# Patient Record
Sex: FEMALE | Race: ASIAN | Employment: OTHER | URBAN - METROPOLITAN AREA
[De-identification: names, ages, dates, MRNs, and addresses within clinical notes are randomized per-mention and may not be internally consistent; named-entity substitution may affect disease eponyms.]

---

## 2019-06-06 ENCOUNTER — OFFICE VISIT (OUTPATIENT)
Dept: URGENT CARE | Facility: URGENT CARE | Age: 54
End: 2019-06-06
Payer: MEDICAID

## 2019-06-06 ENCOUNTER — ANCILLARY PROCEDURE (OUTPATIENT)
Dept: GENERAL RADIOLOGY | Facility: CLINIC | Age: 54
End: 2019-06-06
Attending: PHYSICIAN ASSISTANT
Payer: MEDICAID

## 2019-06-06 VITALS
RESPIRATION RATE: 16 BRPM | OXYGEN SATURATION: 98 % | HEART RATE: 78 BPM | DIASTOLIC BLOOD PRESSURE: 93 MMHG | TEMPERATURE: 98 F | WEIGHT: 131.4 LBS | SYSTOLIC BLOOD PRESSURE: 165 MMHG

## 2019-06-06 DIAGNOSIS — M25.512 ACUTE PAIN OF LEFT SHOULDER: Primary | ICD-10-CM

## 2019-06-06 PROCEDURE — 99204 OFFICE O/P NEW MOD 45 MIN: CPT | Performed by: PHYSICIAN ASSISTANT

## 2019-06-06 PROCEDURE — 73030 X-RAY EXAM OF SHOULDER: CPT | Mod: LT

## 2019-06-06 RX ORDER — LIDOCAINE 50 MG/G
OINTMENT TOPICAL 3 TIMES DAILY
Qty: 240 G | Refills: 0 | Status: SHIPPED | OUTPATIENT
Start: 2019-06-06 | End: 2020-02-20

## 2019-06-06 RX ORDER — IBUPROFEN 600 MG/1
600 TABLET, FILM COATED ORAL EVERY 6 HOURS PRN
Qty: 30 TABLET | Refills: 0 | Status: SHIPPED | OUTPATIENT
Start: 2019-06-06 | End: 2020-02-20

## 2019-06-06 SDOH — HEALTH STABILITY: MENTAL HEALTH: HOW OFTEN DO YOU HAVE A DRINK CONTAINING ALCOHOL?: NEVER

## 2019-06-06 ASSESSMENT — ENCOUNTER SYMPTOMS
PALPITATIONS: 0
WHEEZING: 0
RESPIRATORY NEGATIVE: 1
FATIGUE: 0
SHORTNESS OF BREATH: 0
MYALGIAS: 1
COUGH: 0
NECK PAIN: 0
JOINT SWELLING: 1
FEVER: 0
BACK PAIN: 0
CARDIOVASCULAR NEGATIVE: 1
ARTHRALGIAS: 1
CONSTITUTIONAL NEGATIVE: 1
NECK STIFFNESS: 0
CHILLS: 0

## 2019-06-06 ASSESSMENT — PAIN SCALES - GENERAL: PAINLEVEL: WORST PAIN (10)

## 2019-06-07 NOTE — PROGRESS NOTES
Subjective   Gerson Mcgrath is a 53 year old female who presents to clinic today with son for the following health issues:  HPI   Musculoskeletal problem/pain    Duration: yesterday.  Chronic and intermittent with worsening over the past 2days.  No precipitating trauma or injuries.    Description  Location: L shoulder    Intensity:  moderate, severe    Accompanying signs and symptoms: No radicular pain, numbness, tingling or weakness.  No swelling, redness, drainage or fevers.  Reports decrease ROM.  R hand dominant.    History  Previous similar problem: YES  Previous evaluation:  none    Precipitating or alleviating factors:  Trauma or overuse: no   Aggravating factors include:     Therapies tried and outcome: rest/inactivity, acetaminophen and Ibuprofen with minimal relief      There is no problem list on file for this patient.    No past surgical history on file.    Social History     Tobacco Use     Smoking status: Never Smoker     Smokeless tobacco: Never Used   Substance Use Topics     Alcohol use: Never     Frequency: Never     History reviewed. No pertinent family history.      No current outpatient medications on file.     No Known Allergies    Reviewed and updated as needed this visit by Provider       Review of Systems   Constitutional: Negative.  Negative for chills, fatigue and fever.   Respiratory: Negative.  Negative for cough, shortness of breath and wheezing.    Cardiovascular: Negative.  Negative for chest pain, palpitations and peripheral edema.   Genitourinary: Negative.    Musculoskeletal: Positive for arthralgias, joint swelling and myalgias. Negative for back pain, gait problem, neck pain and neck stiffness.   All other systems reviewed and are negative.           Objective    BP (!) 165/93 (BP Location: Right arm, Patient Position: Sitting, Cuff Size: Adult Regular)   Pulse 78   Temp 98  F (36.7  C) (Oral)   Resp 16   Wt 59.6 kg (131 lb 6.4 oz)   SpO2 98%   There is no height or weight on  file to calculate BMI.  Physical Exam   Constitutional: She is oriented to person, place, and time. She appears well-developed and well-nourished. No distress.   Musculoskeletal:        Right shoulder: Normal. She exhibits normal range of motion, no tenderness, no swelling, no deformity, no laceration and normal strength.        Left shoulder: She exhibits decreased range of motion, tenderness, bony tenderness and spasm. She exhibits no swelling, no effusion, no crepitus, no deformity, no laceration, normal pulse and normal strength.        Left hand: Normal. She exhibits normal range of motion, no bony tenderness, normal two-point discrimination, normal capillary refill, no deformity and no swelling. Normal sensation noted. Normal strength noted.   Neurological: She is alert and oriented to person, place, and time. She has normal strength and normal reflexes. No sensory deficit. Gait normal.   Skin: Skin is warm and intact.   Distal pulses are 2+ and symmetric.  No peripheral edema.   Psychiatric: She has a normal mood and affect. Her behavior is normal. Judgment and thought content normal.   Nursing note and vitals reviewed.  3V of L shoulder:  No acute fractures or dislocations.  No soft tissue swelling or masses.  Per my read.  Will send for overread.           Assessment & Plan   Acute pain of left shoulder:  Xrays are negative for acute injuries. Most likely strain/sprain vs tendinitis vs rotator cuff.  Recommend RICE, codman's exercises, and will give ibuprofen and lidocaine ointment prn pain.  Will also send to orthopedics for further evaluation and management.  Recheck in clinic if symptoms worsen or if symptoms do not improve.   -     XR Shoulder Left G/E 3 Views  -     ORTHO  REFERRAL  -     ibuprofen (ADVIL/MOTRIN) 600 MG tablet; Take 1 tablet (600 mg) by mouth every 6 hours as needed for moderate pain  -     lidocaine (XYLOCAINE) 5 % external ointment; Apply topically 3 times daily        Dionna  See ASHER Jaime  [facility]

## 2020-01-09 ENCOUNTER — OFFICE VISIT (OUTPATIENT)
Dept: FAMILY MEDICINE | Facility: CLINIC | Age: 55
End: 2020-01-09
Payer: MEDICAID

## 2020-01-09 VITALS
SYSTOLIC BLOOD PRESSURE: 180 MMHG | TEMPERATURE: 98.1 F | HEIGHT: 58 IN | BODY MASS INDEX: 27.5 KG/M2 | RESPIRATION RATE: 12 BRPM | DIASTOLIC BLOOD PRESSURE: 99 MMHG | WEIGHT: 131 LBS | OXYGEN SATURATION: 98 % | HEART RATE: 96 BPM

## 2020-01-09 DIAGNOSIS — Z23 NEED FOR IMMUNIZATION AGAINST INFLUENZA: ICD-10-CM

## 2020-01-09 DIAGNOSIS — R03.0 ELEVATED BLOOD PRESSURE READING WITHOUT DIAGNOSIS OF HYPERTENSION: ICD-10-CM

## 2020-01-09 DIAGNOSIS — Z11.4 SCREENING FOR HIV (HUMAN IMMUNODEFICIENCY VIRUS): ICD-10-CM

## 2020-01-09 DIAGNOSIS — Z23 NEED FOR DIPHTHERIA-TETANUS-PERTUSSIS (TDAP) VACCINE: ICD-10-CM

## 2020-01-09 DIAGNOSIS — Z11.59 NEED FOR HEPATITIS C SCREENING TEST: ICD-10-CM

## 2020-01-09 DIAGNOSIS — J20.9 ACUTE BRONCHITIS, UNSPECIFIED ORGANISM: Primary | ICD-10-CM

## 2020-01-09 PROCEDURE — 36415 COLL VENOUS BLD VENIPUNCTURE: CPT | Performed by: FAMILY MEDICINE

## 2020-01-09 PROCEDURE — 90471 IMMUNIZATION ADMIN: CPT | Performed by: FAMILY MEDICINE

## 2020-01-09 PROCEDURE — 90472 IMMUNIZATION ADMIN EACH ADD: CPT | Performed by: FAMILY MEDICINE

## 2020-01-09 PROCEDURE — 90682 RIV4 VACC RECOMBINANT DNA IM: CPT | Performed by: FAMILY MEDICINE

## 2020-01-09 PROCEDURE — 90715 TDAP VACCINE 7 YRS/> IM: CPT | Performed by: FAMILY MEDICINE

## 2020-01-09 PROCEDURE — 99203 OFFICE O/P NEW LOW 30 MIN: CPT | Mod: 25 | Performed by: FAMILY MEDICINE

## 2020-01-09 PROCEDURE — 80048 BASIC METABOLIC PNL TOTAL CA: CPT | Performed by: FAMILY MEDICINE

## 2020-01-09 PROCEDURE — G0472 HEP C SCREEN HIGH RISK/OTHER: HCPCS | Performed by: FAMILY MEDICINE

## 2020-01-09 PROCEDURE — 87389 HIV-1 AG W/HIV-1&-2 AB AG IA: CPT | Performed by: FAMILY MEDICINE

## 2020-01-09 RX ORDER — PREDNISONE 20 MG/1
20 TABLET ORAL
Qty: 6 TABLET | Refills: 0 | Status: SHIPPED | OUTPATIENT
Start: 2020-01-09 | End: 2020-01-23

## 2020-01-09 ASSESSMENT — PAIN SCALES - GENERAL: PAINLEVEL: NO PAIN (0)

## 2020-01-09 ASSESSMENT — MIFFLIN-ST. JEOR: SCORE: 1082.93

## 2020-01-09 NOTE — LETTER
[address]  [address]  [phone]    January 20, 2020      Gerson Mcgrath  [address] [facility] [address]      Ms. Mcgrath,     All of your labs were normal.  Your cholesterol levels were not checked because you were not fasting. You can make at appointment with the lab to check fasting cholesterol levels at your earliest convenience.     Please contact the clinic if you have additional questions.  Thank you.     Sincerely,       Cruz Bueno MD

## 2020-01-10 LAB
ANION GAP SERPL CALCULATED.3IONS-SCNC: 4 MMOL/L (ref 3–14)
BUN SERPL-MCNC: 24 MG/DL (ref 7–30)
CALCIUM SERPL-MCNC: 9.3 MG/DL (ref 8.5–10.1)
CHLORIDE SERPL-SCNC: 107 MMOL/L (ref 94–109)
CO2 SERPL-SCNC: 31 MMOL/L (ref 20–32)
CREAT SERPL-MCNC: 0.76 MG/DL (ref 0.52–1.04)
GFR SERPL CREATININE-BSD FRML MDRD: 88 ML/MIN/{1.73_M2}
GLUCOSE SERPL-MCNC: 132 MG/DL (ref 70–99)
POTASSIUM SERPL-SCNC: 3.6 MMOL/L (ref 3.4–5.3)
SODIUM SERPL-SCNC: 142 MMOL/L (ref 133–144)

## 2020-01-10 NOTE — NURSING NOTE
Prior to immunization administration, verified patients identity using patient s name and date of birth. Please see Immunization Activity for additional information.     Screening Questionnaire for Adult Immunization    Are you sick today?   No   Do you have allergies to medications, food, a vaccine component or latex?   No   Have you ever had a serious reaction after receiving a vaccination?   No   Do you have a long-term health problem with heart, lung, kidney, or metabolic disease (e.g., diabetes), asthma, a blood disorder, no spleen, complement component deficiency, a cochlear implant, or a spinal fluid leak?  Are you on long-term aspirin therapy?   No   Do you have cancer, leukemia, HIV/AIDS, or any other immune system problem?   No   Do you have a parent, brother, or sister with an immune system problem?   No   In the past 3 months, have you taken medications that affect  your immune system, such as prednisone, other steroids, or anticancer drugs; drugs for the treatment of rheumatoid arthritis, Crohn s disease, or psoriasis; or have you had radiation treatments?   No   Have you had a seizure, or a brain or other nervous system problem?   No   During the past year, have you received a transfusion of blood or blood    products, or been given immune (gamma) globulin or antiviral drug?   No   For women: Are you pregnant or is there a chance you could become       pregnant during the next month?   No   Have you received any vaccinations in the past 4 weeks?   No     Immunization questionnaire answers were all negative.        Per orders of Dr. Bueno, injection of tdap given by Dionna Dumont MA. Patient instructed to remain in clinic for 15 minutes afterwards, and to report any adverse reaction to me immediately.       Screening performed by Dionna Dumont MA on 1/9/2020 at 7:19 PM.

## 2020-01-10 NOTE — PROGRESS NOTES
"Subjective     Gerson Mcgrath is a 54 year old female who presents to clinic today for the following health issues. She is accompanied by her .:    HPI   ENT Symptoms             Symptoms: cc Present Absent Comment   Fever/Chills   x    Fatigue  x     Muscle Aches  x     Eye Irritation   x    Sneezing  x     Nasal Eliezer/Drg  x  Runny nose   Sinus Pressure/Pain   x    Loss of smell   x    Dental pain   x    Sore Throat  x  itchy   Swollen Glands   x    Ear Pain/Fullness   x    Cough  x  Dry, persistent   Wheeze   x    Chest Pain   x    Shortness of breath   x    Rash   x    Other  x  Loss of appetite     Symptom duration:  4-5 days   Symptom severity:  moderate   Treatments tried:  None   Contacts:  yes, grandchildren (2 - 3 days after being around them, her symptoms started)     Blood pressure   She hasn't seen a doctor in over a year, and she hadn't heard anything about high blood pressure.    Past medical, family, and social histories, medications, and allergies are reviewed and updated in Epic.     ROS:  Other than noted above, general, HEENT, respiratory, cardiac and gastrointestinal systems are negative.    This document serves as a record of the services and decisions personally performed and made by Dr. Bueno. It was created on his behalf by Becca Deng, a trained medical scribe. The creation of this document is based the provider's statements to the medical scribe.  Becca Deng,  7:00 PM      OBJECTIVE:                                                    BP (!) 180/99   Pulse 96   Temp 98.1  F (36.7  C) (Oral)   Resp 12   Ht 1.48 m (4' 10.25\")   Wt 59.4 kg (131 lb)   SpO2 98%   BMI 27.14 kg/m     Body mass index is 27.14 kg/m .     GENERAL APPEARANCE ADULT: Alert, no acute distress  EYES: PERRL, EOM normal, conjunctiva and lids normal  HENT: right TM normal, left TM normal, nose no nasal discharge or congestion, throat/mouth:normal, mucous membranes moist  RESP: lungs clear to auscultation "   CV: normal rate, regular rhythm, no murmur or gallop  SKIN: no suspicious lesions or rashes to visible skin   NEURO: Alert, oriented, speech and mentation normal, Cranial nerves 2-12 are normal.  PSYCH: mentation appears normal., affect and mood normal     ASSESSMENT/PLAN:                                                      (J20.9) Acute bronchitis, unspecified organism  (primary encounter diagnosis)  Comment: secondary to viral URI  Plan: predniSONE (DELTASONE) 20 MG tablet        Follow-up if symptoms persist past 6 days.    (R03.0) Elevated blood pressure reading without diagnosis of hypertension  Comment: elevated reading #2  Plan: Lipid panel reflex to direct LDL Fasting, Basic        metabolic panel         Return in about 2 weeks (around 1/23/2020) for blood pressure check.      (Z11.4) Screening for HIV (human immunodeficiency virus)  Comment: indications for screening discussed with the patient   Plan: HIV Screening            (Z11.59) Need for hepatitis C screening test  Comment: indications for screening discussed with the patient   Plan: Hepatitis C Screen Reflex to HCV RNA Quant and         Genotype            (Z23) Need for immunization against influenza  Comment: Influenza vaccine offered and accepted by patient. She has received it before without problems.   Plan: INFLUENZA QUAD, RECOMBINANT, P-FREE (RIV4)         (FLUBLOCK) [28312]            (Z23) Need for diphtheria-tetanus-pertussis (Tdap) vaccine  Comment:   Plan: TDAP VACCINE (ADACEL)              The information in this document, created by the medical scribe for me, accurately reflects the services I personally performed and the decisions made by me. I have reviewed and approved this document for accuracy prior to leaving the patient care area. January 9, 2020 7:00 PM  Cruz Bueno MD

## 2020-01-10 NOTE — PATIENT INSTRUCTIONS
At [facility], we strive to deliver an exceptional experience to you, every time we see you.  If you receive a survey in the mail, please send us back your thoughts. We really do value your feedback.    Based on your medical history, these are the current health maintenance/preventive care services that you are due for (some may have been done at this visit.)  Health Maintenance Due   Topic Date Due     PREVENTIVE CARE VISIT  1965     HEPATITIS C SCREENING  1965     ADVANCE CARE PLANNING  1965     MAMMO SCREENING  1965     COLONOSCOPY  11/25/1974     HIV SCREENING  11/25/1979     HPV  11/25/1985     PAP  11/25/1989     LIPID  11/25/2009     ZOSTER IMMUNIZATION (1 of 2) 11/25/2014     INFLUENZA VACCINE (1) 09/01/2019     DTAP/TDAP/TD IMMUNIZATION (2 - Td) 11/13/2019     PHQ-2  01/01/2020         Suggested websites for health information:  Www.[facility].COM.iBoxPay : Up to date and easily searchable information on multiple topics.  Www.medlineplus.gov : medication info, interactive tutorials, watch real surgeries online  Www.familydoctor.org : good info from the Academy of Family Physicians  Www.cdc.gov : public health info, travel advisories, epidemics (H1N1)  Www.aap.org : children's health info, normal development, vaccinations  Www.health.[facility].[place].us : [place] dept of health, public health issues in [place], N1N1    Your care team:                            Family Medicine Internal Medicine   MD Ubaldo Dubois MD Shantel Branch-Fleming, MD Katya Georgiev PA-C Nam Ho, MD Pediatrics   ASHER Andujar, MD Urszula Goldberg CNP, MD Deborah Mielke, MD Kim Thein, APRN CNP      Clinic hours: Monday - Thursday 7 am-7 pm; Fridays 7 am-5 pm.   Urgent care: Monday - Friday 11 am-9 pm; Saturday and Sunday 9 am-5 pm.  Pharmacy : Monday -Thursday 8 am-8 pm; Friday 8 am-6 pm; Saturday and Sunday 9 am-5 pm.      Clinic: [phone]   Pharmacy: [phone]

## 2020-01-13 LAB
HCV AB SERPL QL IA: NONREACTIVE
HIV 1+2 AB+HIV1 P24 AG SERPL QL IA: NONREACTIVE

## 2020-01-23 ENCOUNTER — OFFICE VISIT (OUTPATIENT)
Dept: FAMILY MEDICINE | Facility: CLINIC | Age: 55
End: 2020-01-23
Payer: MEDICAID

## 2020-01-23 VITALS
RESPIRATION RATE: 16 BRPM | TEMPERATURE: 98.2 F | DIASTOLIC BLOOD PRESSURE: 93 MMHG | BODY MASS INDEX: 27.56 KG/M2 | SYSTOLIC BLOOD PRESSURE: 156 MMHG | HEART RATE: 92 BPM | HEIGHT: 58 IN | WEIGHT: 131.3 LBS | OXYGEN SATURATION: 97 %

## 2020-01-23 DIAGNOSIS — J20.9 ACUTE BRONCHITIS WITH SYMPTOMS > 10 DAYS: Primary | ICD-10-CM

## 2020-01-23 DIAGNOSIS — Z12.11 SCREEN FOR COLON CANCER: ICD-10-CM

## 2020-01-23 DIAGNOSIS — Z12.31 ENCOUNTER FOR SCREENING MAMMOGRAM FOR BREAST CANCER: ICD-10-CM

## 2020-01-23 DIAGNOSIS — I10 ESSENTIAL HYPERTENSION WITH GOAL BLOOD PRESSURE LESS THAN 140/90: ICD-10-CM

## 2020-01-23 DIAGNOSIS — K59.00 CONSTIPATION, UNSPECIFIED CONSTIPATION TYPE: ICD-10-CM

## 2020-01-23 PROCEDURE — 99214 OFFICE O/P EST MOD 30 MIN: CPT | Performed by: FAMILY MEDICINE

## 2020-01-23 RX ORDER — DOXYCYCLINE 100 MG/1
100 CAPSULE ORAL 2 TIMES DAILY
Qty: 20 CAPSULE | Refills: 0 | Status: SHIPPED | OUTPATIENT
Start: 2020-01-23 | End: 2020-02-20

## 2020-01-23 RX ORDER — BENZONATATE 200 MG/1
200 CAPSULE ORAL 3 TIMES DAILY PRN
Qty: 30 CAPSULE | Refills: 3 | Status: SHIPPED | OUTPATIENT
Start: 2020-01-23

## 2020-01-23 RX ORDER — LOSARTAN POTASSIUM AND HYDROCHLOROTHIAZIDE 12.5; 5 MG/1; MG/1
1 TABLET ORAL EVERY MORNING
Qty: 30 TABLET | Refills: 3 | Status: SHIPPED | OUTPATIENT
Start: 2020-01-23 | End: 2020-02-20

## 2020-01-23 RX ORDER — POLYETHYLENE GLYCOL 3350 17 G/17G
1 POWDER, FOR SOLUTION ORAL DAILY
Qty: 850 G | Refills: 11 | Status: SHIPPED | OUTPATIENT
Start: 2020-01-23

## 2020-01-23 ASSESSMENT — MIFFLIN-ST. JEOR: SCORE: 1084.29

## 2020-01-23 ASSESSMENT — PAIN SCALES - GENERAL: PAINLEVEL: NO PAIN (0)

## 2020-01-23 NOTE — PROGRESS NOTES
Subjective     Gerson Mcgrath is a 54 year old female who presents to clinic today for the following health issues:    HPI   Acute Illness   Acute illness concerns: cough  Onset: follow up 01/09/2020    Fever: no    Chills/Sweats: no    Headache (location?): YES    Sinus Pressure:no    Conjunctivitis:  no    Ear Pain: no    Rhinorrhea: YES    Congestion: no    Sore Throat: no- itchy      Cough: YES    Wheeze: no    Decreased Appetite: no    Nausea: no    Vomiting: no    Diarrhea:  no    Dysuria/Freq.: no    Fatigue/Achiness: no    Sick/Strep Exposure: no     Therapies Tried and outcome: prednisone       Patient Active Problem List   Diagnosis     Essential hypertension with goal blood pressure less than 140/90     History reviewed. No pertinent surgical history.    Social History     Tobacco Use     Smoking status: Never Smoker     Smokeless tobacco: Never Used   Substance Use Topics     Alcohol use: Never     Frequency: Never     Family History   Family history unknown: Yes         Current Outpatient Medications   Medication Sig Dispense Refill     benzonatate (TESSALON) 200 MG capsule Take 1 capsule (200 mg) by mouth 3 times daily as needed for cough 30 capsule 3     doxycycline monohydrate (MONODOX) 100 MG capsule Take 1 capsule (100 mg) by mouth 2 times daily for 10 days 20 capsule 0     losartan-hydrochlorothiazide (HYZAAR) 50-12.5 MG tablet Take 1 tablet by mouth every morning For blood pressure. 30 tablet 3     polyethylene glycol (MIRALAX/GLYCOLAX) powder Take 17 g (1 capful) by mouth daily For constipation. 850 g 11     ibuprofen (ADVIL/MOTRIN) 600 MG tablet Take 1 tablet (600 mg) by mouth every 6 hours as needed for moderate pain (Patient not taking: Reported on 1/23/2020) 30 tablet 0     lidocaine (XYLOCAINE) 5 % external ointment Apply topically 3 times daily (Patient not taking: Reported on 1/23/2020) 240 g 0     No Known Allergies  BP Readings from Last 3 Encounters:   01/23/20 (!) 156/93   01/09/20 (!)  "180/99   06/06/19 (!) 165/93    Wt Readings from Last 3 Encounters:   01/23/20 59.6 kg (131 lb 4.8 oz)   01/09/20 59.4 kg (131 lb)   06/06/19 59.6 kg (131 lb 6.4 oz)               Reviewed and updated as needed this visit by Provider         Review of Systems   ROS COMP: Constitutional, HEENT, cardiovascular, pulmonary, GI, , musculoskeletal, neuro, skin, endocrine and psych systems are negative, except as otherwise noted.      Objective    BP (!) 156/93 (BP Location: Right arm, Patient Position: Sitting, Cuff Size: Adult Large)   Pulse 92   Temp 98.2  F (36.8  C) (Oral)   Resp 16   Ht 1.48 m (4' 10.25\")   Wt 59.6 kg (131 lb 4.8 oz)   LMP  (LMP Unknown)   SpO2 97%   Breastfeeding No   BMI 27.21 kg/m    Body mass index is 27.21 kg/m .  Physical Exam   GENERAL: healthy, alert and no distress  NECK: no adenopathy, no asymmetry, masses, or scars and thyroid normal to palpation  RESP: lungs clear to auscultation - no rales, rhonchi or wheezes  CV: regular rate and rhythm, normal S1 S2, no S3 or S4, no murmur, click or rub, no peripheral edema and peripheral pulses strong  ABDOMEN: soft, nontender, no hepatosplenomegaly, no masses and bowel sounds normal  MS: no gross musculoskeletal defects noted, no edema    Diagnostic Test Results:  Labs reviewed in Epic        Assessment & Plan     1. Acute bronchitis with symptoms > 10 days  Not improving. Treat with abx. RTC in 2 weeks if no improvements.  - doxycycline monohydrate (MONODOX) 100 MG capsule; Take 1 capsule (100 mg) by mouth 2 times daily for 10 days  Dispense: 20 capsule; Refill: 0  - benzonatate (TESSALON) 200 MG capsule; Take 1 capsule (200 mg) by mouth 3 times daily as needed for cough  Dispense: 30 capsule; Refill: 3    2. Essential hypertension with goal blood pressure less than 140/90  Not controlled. Start losartan/hctz. Reviewed low salt diet. RTC in 1 month for recheck.  - losartan-hydrochlorothiazide (HYZAAR) 50-12.5 MG tablet; Take 1 tablet by " "mouth every morning For blood pressure.  Dispense: 30 tablet; Refill: 3    3. Encounter for screening mammogram for breast cancer    - MA SCREENING DIGITAL BILAT - Future  (s+30); Future    4. Constipation, unspecified constipation type    - polyethylene glycol (MIRALAX/GLYCOLAX) powder; Take 17 g (1 capful) by mouth daily For constipation.  Dispense: 850 g; Refill: 11    5. Screen for colon cancer    - Fecal colorectal cancer screen FIT - Future (S+30); Future     BMI:   Estimated body mass index is 27.21 kg/m  as calculated from the following:    Height as of this encounter: 1.48 m (4' 10.25\").    Weight as of this encounter: 59.6 kg (131 lb 4.8 oz).           Regular exercise  See Patient Instructions    Return in about 4 weeks (around 2/20/2020) for Physical Exam.    Rashad Min MD, MD  [facility]      "

## 2020-01-27 DIAGNOSIS — Z12.11 SCREEN FOR COLON CANCER: ICD-10-CM

## 2020-01-27 LAB — HEMOCCULT STL QL IA: NEGATIVE

## 2020-01-27 PROCEDURE — 82274 ASSAY TEST FOR BLOOD FECAL: CPT | Performed by: FAMILY MEDICINE

## 2020-01-31 ENCOUNTER — ANCILLARY PROCEDURE (OUTPATIENT)
Dept: MAMMOGRAPHY | Facility: CLINIC | Age: 55
End: 2020-01-31
Payer: MEDICAID

## 2020-01-31 DIAGNOSIS — Z12.31 ENCOUNTER FOR SCREENING MAMMOGRAM FOR BREAST CANCER: ICD-10-CM

## 2020-01-31 PROCEDURE — 77067 SCR MAMMO BI INCL CAD: CPT | Mod: TC

## 2020-01-31 PROCEDURE — 77063 BREAST TOMOSYNTHESIS BI: CPT | Mod: TC

## 2020-02-20 ENCOUNTER — OFFICE VISIT (OUTPATIENT)
Dept: FAMILY MEDICINE | Facility: CLINIC | Age: 55
End: 2020-02-20
Payer: MEDICAID

## 2020-02-20 VITALS
DIASTOLIC BLOOD PRESSURE: 82 MMHG | TEMPERATURE: 97.9 F | RESPIRATION RATE: 16 BRPM | BODY MASS INDEX: 27.33 KG/M2 | WEIGHT: 130.2 LBS | HEART RATE: 88 BPM | SYSTOLIC BLOOD PRESSURE: 137 MMHG | OXYGEN SATURATION: 98 % | HEIGHT: 58 IN

## 2020-02-20 DIAGNOSIS — I10 ESSENTIAL HYPERTENSION WITH GOAL BLOOD PRESSURE LESS THAN 140/90: Primary | ICD-10-CM

## 2020-02-20 LAB
ANION GAP SERPL CALCULATED.3IONS-SCNC: 5 MMOL/L (ref 3–14)
BUN SERPL-MCNC: 17 MG/DL (ref 7–30)
CALCIUM SERPL-MCNC: 9.3 MG/DL (ref 8.5–10.1)
CHLORIDE SERPL-SCNC: 108 MMOL/L (ref 94–109)
CO2 SERPL-SCNC: 30 MMOL/L (ref 20–32)
CREAT SERPL-MCNC: 0.72 MG/DL (ref 0.52–1.04)
CREAT UR-MCNC: 207 MG/DL
GFR SERPL CREATININE-BSD FRML MDRD: >90 ML/MIN/{1.73_M2}
GLUCOSE SERPL-MCNC: 103 MG/DL (ref 70–99)
MICROALBUMIN UR-MCNC: 79 MG/L
MICROALBUMIN/CREAT UR: 38.21 MG/G CR (ref 0–25)
POTASSIUM SERPL-SCNC: 3.5 MMOL/L (ref 3.4–5.3)
SODIUM SERPL-SCNC: 143 MMOL/L (ref 133–144)

## 2020-02-20 PROCEDURE — 80048 BASIC METABOLIC PNL TOTAL CA: CPT | Performed by: FAMILY MEDICINE

## 2020-02-20 PROCEDURE — 82043 UR ALBUMIN QUANTITATIVE: CPT | Performed by: FAMILY MEDICINE

## 2020-02-20 PROCEDURE — 99213 OFFICE O/P EST LOW 20 MIN: CPT | Performed by: FAMILY MEDICINE

## 2020-02-20 PROCEDURE — 36415 COLL VENOUS BLD VENIPUNCTURE: CPT | Performed by: FAMILY MEDICINE

## 2020-02-20 RX ORDER — LOSARTAN POTASSIUM AND HYDROCHLOROTHIAZIDE 12.5; 5 MG/1; MG/1
1 TABLET ORAL EVERY MORNING
Qty: 90 TABLET | Refills: 1 | Status: SHIPPED | OUTPATIENT
Start: 2020-02-20 | End: 2021-03-17

## 2020-02-20 ASSESSMENT — PAIN SCALES - GENERAL: PAINLEVEL: NO PAIN (0)

## 2020-02-20 ASSESSMENT — MIFFLIN-ST. JEOR: SCORE: 1079.3

## 2020-02-20 NOTE — PROGRESS NOTES
Subjective     Gerson Mcgrath is a 54 year old female who presents to clinic today for the following health issues:    HPI   Hypertension Follow-up      Do you check your blood pressure regularly outside of the clinic? No     Are you following a low salt diet? Yes    Are your blood pressures ever more than 140 on the top number (systolic) OR more   than 90 on the bottom number (diastolic), for example 140/90? N/a       How many servings of fruits and vegetables do you eat daily?  0-1    On average, how many sweetened beverages do you drink each day (Examples: soda, juice, sweet tea, etc.  Do NOT count diet or artificially sweetened beverages)?   2    How many days per week do you exercise enough to make your heart beat faster? 3 or less    How many minutes a day do you exercise enough to make your heart beat faster? 10 - 19    How many days per week do you miss taking your medication? 0      Patient Active Problem List   Diagnosis     Essential hypertension with goal blood pressure less than 140/90     History reviewed. No pertinent surgical history.    Social History     Tobacco Use     Smoking status: Never Smoker     Smokeless tobacco: Never Used   Substance Use Topics     Alcohol use: Never     Frequency: Never     Family History   Family history unknown: Yes         Current Outpatient Medications   Medication Sig Dispense Refill     benzonatate (TESSALON) 200 MG capsule Take 1 capsule (200 mg) by mouth 3 times daily as needed for cough 30 capsule 3     losartan-hydrochlorothiazide 50-12.5 MG PO tablet Take 1 tablet by mouth every morning For blood pressure. 90 tablet 1     polyethylene glycol (MIRALAX/GLYCOLAX) powder Take 17 g (1 capful) by mouth daily For constipation. 850 g 11     No Known Allergies  BP Readings from Last 3 Encounters:   02/20/20 137/82   01/23/20 (!) 156/93   01/09/20 (!) 180/99    Wt Readings from Last 3 Encounters:   02/20/20 59.1 kg (130 lb 3.2 oz)   01/23/20 59.6 kg (131 lb 4.8 oz)  "  01/09/20 59.4 kg (131 lb)                 Reviewed and updated as needed this visit by Provider         Review of Systems   ROS COMP: Constitutional, HEENT, cardiovascular, pulmonary, GI, , musculoskeletal, neuro, skin, endocrine and psych systems are negative, except as otherwise noted.      Objective    /82 (BP Location: Left arm, Patient Position: Chair, Cuff Size: Adult Regular)   Pulse 88   Temp 97.9  F (36.6  C) (Oral)   Resp 16   Ht 1.48 m (4' 10.25\")   Wt 59.1 kg (130 lb 3.2 oz)   LMP  (LMP Unknown)   SpO2 98%   BMI 26.98 kg/m    Body mass index is 26.98 kg/m .  Physical Exam   GENERAL: healthy, alert and no distress  NECK: no adenopathy, no asymmetry, masses, or scars and thyroid normal to palpation  RESP: lungs clear to auscultation - no rales, rhonchi or wheezes  CV: regular rate and rhythm, normal S1 S2, no S3 or S4, no murmur, click or rub, no peripheral edema and peripheral pulses strong  ABDOMEN: soft, nontender, no hepatosplenomegaly, no masses and bowel sounds normal  MS: no gross musculoskeletal defects noted, no edema    Diagnostic Test Results:  Labs reviewed in Epic        Assessment & Plan     1. Essential hypertension with goal blood pressure less than 140/90  Controlled. Continue with current medications. Recheck renal function and lytes today. RTC in 6 months for recheck with physical. Reviewed low salt diet.  - Basic metabolic panel  - Albumin Random Urine Quantitative with Creat Ratio  - losartan-hydrochlorothiazide 50-12.5 MG PO tablet; Take 1 tablet by mouth every morning For blood pressure.  Dispense: 90 tablet; Refill: 1       Work on weight loss  Regular exercise  See Patient Instructions    Return in about 6 months (around 8/20/2020) for Physical Exam.    Rashad Min MD, MD  [facility]      "

## 2020-02-20 NOTE — LETTER
February 20, 2020      Gerson Alcides  [address] [place] [place] [address]            Dear ,    We are writing to inform you of your test results.    Your kidney and electrolyte tests were normal for you. Please follow up in 6 months for routine physical.     Resulted Orders   Basic metabolic panel   Result Value Ref Range    Sodium 143 133 - 144 mmol/L    Potassium 3.5 3.4 - 5.3 mmol/L    Chloride 108 94 - 109 mmol/L    Carbon Dioxide 30 20 - 32 mmol/L    Anion Gap 5 3 - 14 mmol/L    Glucose 103 (H) 70 - 99 mg/dL      Comment:      Non Fasting    Urea Nitrogen 17 7 - 30 mg/dL    Creatinine 0.72 0.52 - 1.04 mg/dL    GFR Estimate >90 >60 mL/min/[1.73_m2]      Comment:      Non  GFR Calc  Starting 12/18/2018, serum creatinine based estimated GFR (eGFR) will be   calculated using the Chronic Kidney Disease Epidemiology Collaboration   (CKD-EPI) equation.      GFR Estimate If Black >90 >60 mL/min/[1.73_m2]      Comment:       GFR Calc  Starting 12/18/2018, serum creatinine based estimated GFR (eGFR) will be   calculated using the Chronic Kidney Disease Epidemiology Collaboration   (CKD-EPI) equation.      Calcium 9.3 8.5 - 10.1 mg/dL   Albumin Random Urine Quantitative with Creat Ratio   Result Value Ref Range    Creatinine Urine 207 mg/dL    Albumin Urine mg/L 79 mg/L    Albumin Urine mg/g Cr 38.21 (H) 0 - 25 mg/g Cr       If you have any questions or concerns, please call the clinic at the number listed above.       Sincerely,      Rashad Min MD/mayda

## 2020-02-20 NOTE — PATIENT INSTRUCTIONS
At [facility], we strive to deliver an exceptional experience to you, every time we see you. If you receive a survey, please complete it as we do value your feedback.  If you have MyChart, you can expect to receive results automatically within 24 hours of their completion.  Your provider will send a note interpreting your results as well.   If you do not have MyChart, you should receive your results in about a week by mail.    Your care team:                            Family Medicine Internal Medicine   MD Ubaldo Dubois MD Shantel Branch-Fleming, MD Katya Georgiev PA-C Megan Hill, APRFANNY Min, MD Pediatrics   Ken Villa, PARossC  Yolie Moreno, MD Tete Alicea APRN CNP   MD Urszula Melendrez MD Deborah Mielke, MD Kim Thein, APRN CNP      Clinic hours: Monday - Thursday 7 am-7 pm; Fridays 7 am-5 pm.   Urgent care: Monday - Friday 11 am-9 pm; Saturday and Sunday 9 am-5 pm.  Pharmacy : Monday -Thursday 8 am-8 pm; Friday 8 am-6 pm; Saturday and Sunday 9 am-5 pm.     Clinic: [phone]   Pharmacy: [phone]

## 2020-06-11 ENCOUNTER — VIRTUAL VISIT (OUTPATIENT)
Dept: FAMILY MEDICINE | Facility: OTHER | Age: 55
End: 2020-06-11

## 2020-06-11 NOTE — PROGRESS NOTES
"Date: 2020 15:29:49  Clinician: Ramone Lal  Clinician NPI: [id number]  Patient: Gerson Mcgrath  Patient : 1965  Patient Address: [address] [place] [address]  Patient Phone: [phone]  Visit Protocol: URI  Patient Summary:  Gerson is a 54 year old ( : 1965 ) female who initiated a Visit for COVID-19 (Coronavirus) evaluation and screening. When asked the question \"Please sign me up to receive news, health information and promotions. \", Gerson responded \"No\".    Gerson states her symptoms started gradually 3-4 days ago.   Her symptoms consist of a sore throat, malaise, myalgia, a cough, and a headache.   Symptom details     Cough: Gerson coughs a few times an hour and her cough is more bothersome at night. Phlegm does not come into her throat when she coughs. She does not believe her cough is caused by post-nasal drip.     Sore throat: Gerson reports having mild throat pain (1-3 on a 10 point pain scale), does not have exudate on her tonsils, and can swallow liquids. She is not sure if the lymph nodes in her neck are enlarged. A rash has not appeared on the skin since the sore throat started.     Headache: She states the headache is mild (1-3 on a 10 point pain scale).      Gerson denies having wheezing, nausea, teeth pain, ageusia, diarrhea, anosmia, facial pain or pressure, fever, nasal congestion, vomiting, rhinitis, ear pain, and chills. She also denies having recent facial or sinus surgery in the past 60 days, double sickening (worsening symptoms after initial improvement), and taking antibiotic medication for the symptoms. She is not experiencing dyspnea.   Precipitating events  Gerson is not sure if she has been exposed to someone with strep throat. She has not recently been exposed to someone with influenza. Gerson has been in close contact with the following high risk individuals: children under the age of 5.   Pertinent COVID-19 (Coronavirus) information  In the past 14 days, Gerson has " not worked in a congregate living setting.   She does not work or volunteer as healthcare worker or a  and does not work or volunteer in a healthcare facility.   Gerson also has not lived in a congregate living setting in the past 14 days. She does not live with a healthcare worker.   Gerson has had a close contact with a laboratory-confirmed COVID-19 patient within 14 days of symptom onset. Additional information about contact with COVID-19 (Coronavirus) patient as reported by the patient (free text): I've in direct contact with an individual who tested positive for the COVID-19.   Pertinent medical history  Gerson does not get yeast infections when she takes antibiotics.   Gerson does not need a return to work/school note.   Weight: 135 lbs   Gerson does not smoke or use smokeless tobacco.   Weight: 135 lbs    MEDICATIONS: No current medications, ALLERGIES: NKDA  Clinician Response:  Dear Gerson,   Your symptoms show that you may have coronavirus (COVID-19). This illness can cause fever, cough and trouble breathing. Many people get a mild case and get better on their own. Some people can get very sick.  What should I do?  We would like to test you for this virus. This will be a curbside test done outside the clinic.   1. Please call [phone] to schedule your visit. Explain that you were referred by [facility] to have a COVID-19 test. Be ready to share your [facility] visit ID number.  The following will serve as your written order for this COVID Test, ordered by me, for the indication of suspected COVID [Z20.828]: The test will be ordered in [employer], our electronic health record, after you are scheduled. It will show as ordered and authorized by Kirby Dumont MD.  Order: COVID-19 (Coronavirus) PCR for SYMPTOMATIC testing from [facility].      2. When it's time for your COVID test:  Stay at least 6 feet away from others. (If someone will drive you to your test, stay in the backseat, as far away from the  as you can.)    "Cover your mouth and nose with a mask, tissue or washcloth.  Go straight to the testing site. Don't make any stops on the way there or back.      3.Starting now: Stay home and away from others (self-isolate) until:   You've had no fever---and no medicine that reduces fever---for 3 full days (72 hours). And...   Your other symptoms have gotten better. For example, your cough or breathing has improved. And...   At least 10 days have passed since your symptoms started.       During this time, don't leave the house except for testing or medical care.   Stay in your own room, even for meals. Use your own bathroom if you can.   Stay away from others in your home. No hugging, kissing or shaking hands. No visitors.  Don't go to work, school or anywhere else.    Clean \"high touch\" surfaces often (doorknobs, counters, handles, etc.). Use a household cleaning spray or wipes. You'll find a full list of  on the EPA website: www.epa.gov/pesticide-registration/list-n-disinfectants-use-against-sars-cov-2.   Cover your mouth and nose with a mask, tissue or washcloth to avoid spreading germs.  Wash your hands and face often. Use soap and water.  Caregivers in these groups are at risk for severe illness due to COVID-19:  o People 65 years and older  o People who live in a nursing home or long-term care facility  o People with chronic disease (lung, heart, cancer, diabetes, kidney, liver, immunologic)  o People who have a weakened immune system, including those who:   Are in cancer treatment  Take medicine that weakens the immune system, such as corticosteroids  Had a bone marrow or organ transplant  Have an immune deficiency  Have poorly controlled HIV or AIDS  Are obese (body mass index of 40 or higher)  Smoke regularly   o Caregivers should wear gloves while washing dishes, handling laundry and cleaning bedrooms and bathrooms.  o Use caution when washing and drying laundry: Don't shake dirty laundry, and use the warmest " water setting that you can.  o For more tips, go to www.cdc.gov/coronavirus/2019-ncov/downloads/10Things.pdf.      How can I take care of myself?   Get lots of rest. Drink extra fluids (unless a doctor has told you not to).   Take Tylenol (acetaminophen) for fever or pain. If you have liver or kidney problems, ask your family doctor if it's okay to take Tylenol.   Adults can take either:    650 mg (two 325 mg pills) every 4 to 6 hours, or...   1,000 mg (two 500 mg pills) every 8 hours as needed.    Note: Don't take more than 3,000 mg in one day. Acetaminophen is found in many medicines (both prescribed and over-the-counter medicines). Read all labels to be sure you don't take too much.   For children, check the Tylenol bottle for the right dose. The dose is based on the child's age or weight.    If you have other health problems (like cancer, heart failure, an organ transplant or severe kidney disease): Call your specialty clinic if you don't feel better in the next 2 days.       Know when to call 911. Emergency warning signs include:    Trouble breathing or shortness of breath Pain or pressure in the chest that doesn't go away Feeling confused like you haven't felt before, or not being able to wake up Bluish-colored lips or face.  Where can I get more information?   [facility] -- About COVID-19: [email or web address]   CDC -- What to Do If You're Sick: www.cdc.gov/coronavirus/2019-ncov/about/steps-when-sick.html   CDC -- Ending Home Isolation: www.cdc.gov/coronavirus/2019-ncov/hcp/disposition-in-home-patients.html   CDC -- Caring for Someone: www.cdc.gov/coronavirus/2019-ncov/if-you-are-sick/care-for-someone.html   [facility] -- Interim Guidance for Hospital Discharge to Home: www.health.[facility].[place]/diseases/coronavirus/hcp/hospdischarge.pdf   [facility] clinical trials (COVID-19 research studies): clinicalaffairs.[facility]/umn-clinical-trials    Below are the COVID-19 hotlines at the  [place] Department of Health ([facility]). Interpreters are available.    For health questions: Call 668-967-9026 or 1-937.363.1495 (7 a.m. to 7 p.m.) For questions about schools and childcare: Call 677-348-9230 or 1-825.561.2629 (7 a.m. to 7 p.m.)    Diagnosis: Other malaise  Diagnosis ICD: R53.81

## 2021-03-17 ENCOUNTER — ANCILLARY PROCEDURE (OUTPATIENT)
Dept: GENERAL RADIOLOGY | Facility: CLINIC | Age: 56
End: 2021-03-17
Attending: FAMILY MEDICINE
Payer: MEDICAID

## 2021-03-17 ENCOUNTER — OFFICE VISIT (OUTPATIENT)
Dept: FAMILY MEDICINE | Facility: CLINIC | Age: 56
End: 2021-03-17
Payer: MEDICAID

## 2021-03-17 VITALS
HEIGHT: 58 IN | RESPIRATION RATE: 18 BRPM | SYSTOLIC BLOOD PRESSURE: 136 MMHG | OXYGEN SATURATION: 98 % | HEART RATE: 91 BPM | TEMPERATURE: 98.6 F | WEIGHT: 135 LBS | DIASTOLIC BLOOD PRESSURE: 85 MMHG | BODY MASS INDEX: 28.34 KG/M2

## 2021-03-17 DIAGNOSIS — I10 ESSENTIAL HYPERTENSION WITH GOAL BLOOD PRESSURE LESS THAN 140/90: ICD-10-CM

## 2021-03-17 DIAGNOSIS — G89.29 CHRONIC RIGHT SHOULDER PAIN: Primary | ICD-10-CM

## 2021-03-17 DIAGNOSIS — R21 RASH: ICD-10-CM

## 2021-03-17 DIAGNOSIS — J20.9 ACUTE BRONCHITIS WITH SYMPTOMS > 10 DAYS: ICD-10-CM

## 2021-03-17 DIAGNOSIS — M25.511 CHRONIC RIGHT SHOULDER PAIN: Primary | ICD-10-CM

## 2021-03-17 PROCEDURE — 99214 OFFICE O/P EST MOD 30 MIN: CPT | Performed by: FAMILY MEDICINE

## 2021-03-17 PROCEDURE — 73030 X-RAY EXAM OF SHOULDER: CPT | Mod: RT | Performed by: RADIOLOGY

## 2021-03-17 RX ORDER — TRIAMCINOLONE ACETONIDE 1 MG/G
OINTMENT TOPICAL 2 TIMES DAILY
Qty: 80 G | Refills: 3 | Status: SHIPPED | OUTPATIENT
Start: 2021-03-17

## 2021-03-17 RX ORDER — KETOCONAZOLE 20 MG/G
CREAM TOPICAL 2 TIMES DAILY
Qty: 60 G | Refills: 3 | Status: SHIPPED | OUTPATIENT
Start: 2021-03-17

## 2021-03-17 RX ORDER — LOSARTAN POTASSIUM AND HYDROCHLOROTHIAZIDE 12.5; 5 MG/1; MG/1
1 TABLET ORAL EVERY MORNING
Qty: 90 TABLET | Refills: 3 | Status: SHIPPED | OUTPATIENT
Start: 2021-03-17

## 2021-03-17 ASSESSMENT — MIFFLIN-ST. JEOR: SCORE: 1096.08

## 2021-03-17 ASSESSMENT — PAIN SCALES - GENERAL: PAINLEVEL: WORST PAIN (10)

## 2021-03-17 NOTE — PATIENT INSTRUCTIONS
At [facility], we strive to deliver an exceptional experience to you, every time we see you. If you receive a survey, please complete it as we do value your feedback.  If you have MyChart, you can expect to receive results automatically within 24 hours of their completion.  Your provider will send a note interpreting your results as well.   If you do not have MyChart, you should receive your results in about a week by mail.    Your care team:                            Family Medicine Internal Medicine   MD Ubaldo Dubois MD Shantel Branch-Fleming, MD Srinivasa Vaka, MD Katya Belousova, PAHOMER Roblero, APRN CNP    Rashad Min, MD Pediatrics   Ken Villa, PAHOMER Moreno, CNP MD Tete Coon APRN CNP   MD Urszula Melendrez MD Deborah Mielke, MD Rosa Ward, APRN [facility]      Clinic hours: Monday - Thursday 7 am-6 pm; Fridays 7 am-5 pm.   Urgent care: Monday - Friday 11 am-9 pm; Saturday and Sunday 9 am-5 pm.    Clinic: [phone]       [facility]: Monday - Thursday 8 am - 7 pm; Friday 8 am - 6 pm  [facility]: [phone]     Use www.[email or web address].org for 24/7 diagnosis and treatment of dozens of conditions.

## 2021-03-17 NOTE — PROGRESS NOTES
"Jesse Nieto is a 55 year old who presents for the following health issues   HPI     Musculoskeletal problem/pain  Onset/Duration:couple weeks  Description  Location:  right shoulder  Joint Swelling: no  Redness: no  Pain: YES- 10  Warmth: no  Intensity:  severe  Progression of Symptoms:  worsening  Accompanying signs and symptoms:   Fevers: no  Numbness/tingling/weakness: no  History  Trauma to the area: no  Recent illness:  no  Previous similar problem: YES  Previous evaluation:  YES  Precipitating or alleviating factors:  Aggravating factors include: lifting, exercise and overuse  Therapies tried and outcome: stretching, exercises, acetaminophen and but not helping    Patient would like a rash looked at under her left breast.    Review of Systems   Constitutional, HEENT, cardiovascular, pulmonary, GI, , musculoskeletal, neuro, skin, endocrine and psych systems are negative, except as otherwise noted.      Objective    /85   Pulse 91   Temp 98.6  F (37  C) (Tympanic)   Resp 18   Ht 1.48 m (4' 10.25\")   Wt 61.2 kg (135 lb)   LMP  (LMP Unknown)   SpO2 98%   BMI 27.97 kg/m    Body mass index is 27.97 kg/m .  Physical Exam   GENERAL: healthy, alert and no distress  NECK: no adenopathy, no asymmetry, masses, or scars and thyroid normal to palpation  RESP: lungs clear to auscultation - no rales, rhonchi or wheezes  CV: regular rate and rhythm, normal S1 S2, no S3 or S4, no murmur, click or rub, no peripheral edema and peripheral pulses strong  ABDOMEN: soft, nontender, no hepatosplenomegaly, no masses and bowel sounds normal  MS: positive O'fany's right  Skin: erythematous patches under left breast.    A/P:  (M25.511,G89.29) Chronic right shoulder pain  (primary encounter diagnosis)  Comment:   Plan: XR Shoulder Right 2 Views, MR Shoulder Right         w/o Contrast, diclofenac (VOLTAREN) 50 MG EC         tablet        Labral tear? Will await MRI scan. May take nsaids sparingly for " pain.    (R21) Rash  Comment:   Plan: ketoconazole (NIZORAL) 2 % external cream,         triamcinolone (KENALOG) 0.1 % external ointment        Fungal? Treat with anti-fungal with topical steroid.    (I10) Essential hypertension with goal blood pressure less than 140/90  Comment:   Plan: losartan-hydrochlorothiazide (HYZAAR) 50-12.5         MG tablet        Controlled. Recheck in 6 months with physical.    Rashad Min MD

## 2021-04-07 ENCOUNTER — ANCILLARY PROCEDURE (OUTPATIENT)
Dept: MRI IMAGING | Facility: CLINIC | Age: 56
End: 2021-04-07
Attending: FAMILY MEDICINE
Payer: MEDICAID

## 2021-04-07 DIAGNOSIS — M25.511 CHRONIC RIGHT SHOULDER PAIN: ICD-10-CM

## 2021-04-07 DIAGNOSIS — G89.29 CHRONIC RIGHT SHOULDER PAIN: ICD-10-CM

## 2021-04-07 PROCEDURE — 73221 MRI JOINT UPR EXTREM W/O DYE: CPT | Mod: RT | Performed by: RADIOLOGY

## 2021-04-08 NOTE — PROGRESS NOTES
SUBJECTIVE:  Gerson Mcgrath is a 55 year old female who is seen in consultation at the request of Rashad Min MD for right shoulder pain that started  that started/occurred  6 weeks ago. Has had chronic problems x 5-6 years with episodes of pain, this episode is the longest.    Present symptoms: can't do behind-back activities or x-body movements with right shoulder  Pain laying on right   Pain to lift things and weak.  Feels crepitations.  Pain location: lateral shoulder  Associated symptoms: no neck pain    Treatment up to this point:NSAIDS  Prior history of related problems:  Significant Orthopedic past medical history:     No past medical history on file.    No past surgical history on file.    REVIEW OF SYSTEMS:  CONSTITUTIONAL:  NEGATIVE for fever, chills, change in weight  INTEGUMENTARY/SKIN:  NEGATIVE for worrisome rashes, moles or lesions  EYES:  NEGATIVE for vision changes or irritation  ENT/MOUTH:  NEGATIVE for ear, mouth and throat problems  RESP:  NEGATIVE for significant cough or SOB  BREAST:  NEGATIVE for masses, tenderness or discharge  CV:  NEGATIVE for chest pain, palpitations or peripheral edema  GI:  NEGATIVE for nausea, abdominal pain, heartburn, or change in bowel habits  :  Negative   MUSCULOSKELETAL:  See HPI above  NEURO:  NEGATIVE for weakness, dizziness or paresthesias  ENDOCRINE:  NEGATIVE for temperature intolerance, skin/hair changes  HEME/ALLERGY/IMMUNE:  NEGATIVE for bleeding problems  PSYCHIATRIC:  NEGATIVE for changes in mood or affect    OBJECTIVE:  /82 (BP Location: Left arm, Patient Position: Sitting, Cuff Size: Adult Regular)   Pulse 90   LMP  (LMP Unknown)   SpO2 97%      GENERAL: healthy, alert and no distress  GAIT: normal   SKIN: no suspicious lesions or rashes  NEURO: Normal strength and tone, mentation intact and speech normal  VASCULAR:  normal pulses and cappillary refill   PSYCH:  mentation appears normal and affect normal/bright    MUSCULOSKELETAL:    NECK:  Cervical  range of motion: full,  painfree, and does not cause shoulder pain or reproduce shoulder pain.  Sensory deficits:  none  Motor deficits:  none    SHOULDER:  Shoulder Inspection: no swelling, bruising, discoloration, or obvious deformity or asymmetry  no atrophy  Tender: diffuse  Range of Motion:   Active:all normal   Passive: all normal, crepitations mild  Impingement: Fernandez: 2, Neer: 0 and AER: 1  Strength: forward flexion 5/5, painful, External rotation 5/5  Liftoff: Able   Special tests: Sulcus: 0  Speed: equivocal  Anterior Drawer: 0  Posterior Drawer: 0    X-RAY INTERPRETATION  3/17/21 reviewed with patient: 3 mm ovoid calcification of the greater tuberosity  consistent with calcific tendinopathy. Normal glenohumeral alignment.  The acromioclavicular joint is unremarkable. No fracture or osseous  lesion.     MRI:  Right shoulder, reviewed with the patient:  Impression:  1. Hydroxyapatite deposition of supraspinatus, likely active flare  (calcific tendinitis). Apparent associated moderate grade bursal sided  tear may be accentuated due to calcific tendinitis.   a. Additional low to moderate grade articular sided tear of  supraspinatus.  2. Subacromial/subdeltoid bursitis.  3. Signal heterogeneity posterior superior labrum, may be due to  motion and magic angle artifacts though underlying labral tear cannot  be entirely excluded.     ASSESSMENT    ICD-10-CM    1. Rotator cuff tendonitis, right  M75.81    2. Calcific tendonitis  M65.20        PLAN:   physical therapy and corticosteroid injection discussed  She feels that her pain is  severe enough to try a corticosteroid injection a this time  Procedure Note:   Informed consent obtained. Risks, benefits and complications of the injection were discussed with the patient and the patient elected to proceed. Right shoulder injected into the subacromial space after sterile prep, using 80mg Depomedrol and 4cc local anesthetic.  physical therapy ordered.    Consider  alfa down the road if not improved..      .MOISE Babin MD  Dept. Orthopedic Surgery  [facility]

## 2021-04-10 ENCOUNTER — IMMUNIZATION (OUTPATIENT)
Dept: NURSING | Facility: CLINIC | Age: 56
End: 2021-04-10
Payer: MEDICAID

## 2021-04-10 PROCEDURE — 91301 PR COVID VAC MODERNA 100 MCG/0.5 ML IM: CPT

## 2021-04-10 PROCEDURE — 0011A PR COVID VAC MODERNA 100 MCG/0.5 ML IM: CPT

## 2021-04-13 ENCOUNTER — OFFICE VISIT (OUTPATIENT)
Dept: ORTHOPEDICS | Facility: CLINIC | Age: 56
End: 2021-04-13
Attending: FAMILY MEDICINE
Payer: MEDICAID

## 2021-04-13 VITALS — HEART RATE: 90 BPM | SYSTOLIC BLOOD PRESSURE: 138 MMHG | DIASTOLIC BLOOD PRESSURE: 82 MMHG | OXYGEN SATURATION: 97 %

## 2021-04-13 DIAGNOSIS — M75.81 ROTATOR CUFF TENDONITIS, RIGHT: Primary | ICD-10-CM

## 2021-04-13 DIAGNOSIS — G89.29 CHRONIC RIGHT SHOULDER PAIN: ICD-10-CM

## 2021-04-13 DIAGNOSIS — M25.511 CHRONIC RIGHT SHOULDER PAIN: ICD-10-CM

## 2021-04-13 DIAGNOSIS — M65.20 CALCIFIC TENDONITIS: ICD-10-CM

## 2021-04-13 PROCEDURE — 20610 DRAIN/INJ JOINT/BURSA W/O US: CPT | Mod: RT | Performed by: ORTHOPAEDIC SURGERY

## 2021-04-13 PROCEDURE — 99207 PR DROP WITH A PROCEDURE: CPT | Performed by: ORTHOPAEDIC SURGERY

## 2021-04-13 RX ORDER — METHYLPREDNISOLONE ACETATE 80 MG/ML
80 INJECTION, SUSPENSION INTRA-ARTICULAR; INTRALESIONAL; INTRAMUSCULAR; SOFT TISSUE
Status: SHIPPED | OUTPATIENT
Start: 2021-04-13

## 2021-04-13 RX ORDER — LIDOCAINE HYDROCHLORIDE 10 MG/ML
4 INJECTION, SOLUTION EPIDURAL; INFILTRATION; INTRACAUDAL; PERINEURAL
Status: SHIPPED | OUTPATIENT
Start: 2021-04-13

## 2021-04-13 RX ADMIN — METHYLPREDNISOLONE ACETATE 80 MG: 80 INJECTION, SUSPENSION INTRA-ARTICULAR; INTRALESIONAL; INTRAMUSCULAR; SOFT TISSUE at 13:45

## 2021-04-13 RX ADMIN — LIDOCAINE HYDROCHLORIDE 4 ML: 10 INJECTION, SOLUTION EPIDURAL; INFILTRATION; INTRACAUDAL; PERINEURAL at 13:45

## 2021-04-13 ASSESSMENT — PAIN SCALES - GENERAL: PAINLEVEL: SEVERE PAIN (6)

## 2021-04-13 NOTE — LETTER
4/13/2021         RE: Gerson Mcgrath  [address]  [place] [address]        Dear Colleague,    Thank you for referring your patient, Gerson Mcgrath, to the [facility] [place]. Please see a copy of my visit note below.    SUBJECTIVE:  Gerson Mcgrath is a 55 year old female who is seen in consultation at the request of Rashad Min MD for right shoulder pain that started  that started/occurred  6 weeks ago. Has had chronic problems x 5-6 years with episodes of pain, this episode is the longest.    Present symptoms: can't do behind-back activities or x-body movements with right shoulder  Pain laying on right   Pain to lift things and weak.  Feels crepitations.  Pain location: lateral shoulder  Associated symptoms: no neck pain    Treatment up to this point:NSAIDS  Prior history of related problems:  Significant Orthopedic past medical history:     No past medical history on file.    No past surgical history on file.    REVIEW OF SYSTEMS:  CONSTITUTIONAL:  NEGATIVE for fever, chills, change in weight  INTEGUMENTARY/SKIN:  NEGATIVE for worrisome rashes, moles or lesions  EYES:  NEGATIVE for vision changes or irritation  ENT/MOUTH:  NEGATIVE for ear, mouth and throat problems  RESP:  NEGATIVE for significant cough or SOB  BREAST:  NEGATIVE for masses, tenderness or discharge  CV:  NEGATIVE for chest pain, palpitations or peripheral edema  GI:  NEGATIVE for nausea, abdominal pain, heartburn, or change in bowel habits  :  Negative   MUSCULOSKELETAL:  See HPI above  NEURO:  NEGATIVE for weakness, dizziness or paresthesias  ENDOCRINE:  NEGATIVE for temperature intolerance, skin/hair changes  HEME/ALLERGY/IMMUNE:  NEGATIVE for bleeding problems  PSYCHIATRIC:  NEGATIVE for changes in mood or affect    OBJECTIVE:  /82 (BP Location: Left arm, Patient Position: Sitting, Cuff Size: Adult Regular)   Pulse 90   LMP  (LMP Unknown)   SpO2 97%      GENERAL: healthy, alert and no distress  GAIT: normal   SKIN: no  suspicious lesions or rashes  NEURO: Normal strength and tone, mentation intact and speech normal  VASCULAR:  normal pulses and cappillary refill   PSYCH:  mentation appears normal and affect normal/bright    MUSCULOSKELETAL:    NECK:  Cervical range of motion: full,  painfree, and does not cause shoulder pain or reproduce shoulder pain.  Sensory deficits:  none  Motor deficits:  none    SHOULDER:  Shoulder Inspection: no swelling, bruising, discoloration, or obvious deformity or asymmetry  no atrophy  Tender: diffuse  Range of Motion:   Active:all normal   Passive: all normal, crepitations mild  Impingement: Fernandez: 2, Neer: 0 and AER: 1  Strength: forward flexion 5/5, painful, External rotation 5/5  Liftoff: Able   Special tests: Sulcus: 0  Speed: equivocal  Anterior Drawer: 0  Posterior Drawer: 0    X-RAY INTERPRETATION  3/17/21 reviewed with patient: 3 mm ovoid calcification of the greater tuberosity  consistent with calcific tendinopathy. Normal glenohumeral alignment.  The acromioclavicular joint is unremarkable. No fracture or osseous  lesion.     MRI:  Right shoulder, reviewed with the patient:  Impression:  1. Hydroxyapatite deposition of supraspinatus, likely active flare  (calcific tendinitis). Apparent associated moderate grade bursal sided  tear may be accentuated due to calcific tendinitis.   a. Additional low to moderate grade articular sided tear of  supraspinatus.  2. Subacromial/subdeltoid bursitis.  3. Signal heterogeneity posterior superior labrum, may be due to  motion and magic angle artifacts though underlying labral tear cannot  be entirely excluded.     ASSESSMENT    ICD-10-CM    1. Rotator cuff tendonitis, right  M75.81    2. Calcific tendonitis  M65.20        PLAN:   physical therapy and corticosteroid injection discussed  She feels that her pain is  severe enough to try a corticosteroid injection a this time  Procedure Note:   Informed consent obtained. Risks, benefits and complications  of the injection were discussed with the patient and the patient elected to proceed. Right shoulder injected into the subacromial space after sterile prep, using 80mg Depomedrol and 4cc local anesthetic.  physical therapy ordered.    Consider barbotage down the road if not improved..      .MOISE Babin MD  Dept. Orthopedic Surgery  [facility]      Large Joint Injection/Arthocentesis: R subacromial bursa    Date/Time: 4/13/2021 1:45 PM  Performed by: Jareth Almaguer  Authorized by: Mario Alberto Babin MD     Needle Size:  22 G  Guidance: landmark guided    Approach:  Lateral  Location:  Shoulder      Site:  R subacromial bursa  Medications:  80 mg methylPREDNISolone 80 MG/ML; 4 mL lidocaine (PF) 1 %  Outcome:  Tolerated well, no immediate complications  Procedure discussed: discussed risks, benefits, and alternatives    Consent Given by:  Patient  Timeout: timeout called immediately prior to procedure    Prep: patient was prepped and draped in usual sterile fashion              Again, thank you for allowing me to participate in the care of your patient.        Sincerely,        Mario Alberto Babin MD

## 2021-04-13 NOTE — PATIENT INSTRUCTIONS
Thanks for coming today.  [facility]  [address] [place], [place] [address]Onjkheenu Injections  Cortisone is a type of steroid. It can greatly reduce inflammation (swelling, redness, and irritation). Being injected with cortisone is simple and doesn t take long. But your doctor may ask you questions about your health. Certain medical conditions, such as diabetes, can be affected by cortisone.     Your pain may be relieved by a cortisone injection.    Why Have a Cortisone Injection?  Injecting cortisone can relieve pain for anything from a sports injury to arthritis. Your doctor may suggest an injection if rest, splints, or oral medication doesn t relieve your pain. Injecting cortisone is simpler than having surgery. And cortisone may provide the lasting pain relief that can help you get out and enjoy life again.  Getting the Injection  Your injection will  start by cleaning and numbing your skin at the injection site. Next, you ll be injected with local anesthetics (for short-term pain relief) and cortisone. The injection may last a few moments. A small bandage will be applied over the injection site. You ll then be ready to go home.  After Your Injection  After being injected, make sure you don t injure the treated region. But stay active. Enjoy a walk or some other mild activity. Just be careful not to strain the region that gave you trouble.  The Next Day or Two  Some patients feel more pain after being injected. This is normal, and it will go away soon. Applying ice for 20 minutes at a time to your injury may reduce the increased pain. Rest for the first day or two. You don t need to stay in bed. But avoid tasks that may strain the injured region.  If You Have Diabetes  Cortisone injections can cause blood sugar to be increased for several days after the injection. Follow your regular plan for what to do when your blood sugar is elevated.     You may have the area injected, in general,  "every three to four months.  This is the estimated amount of time that the body takes to metabolize the \"cortisone.\"  Getting injections too frequently may result in a softening of the cartilage and cause the joint to actually wear out more quickly.  "

## 2021-04-13 NOTE — PROGRESS NOTES
Large Joint Injection/Arthocentesis: R subacromial bursa    Date/Time: 4/13/2021 1:45 PM  Performed by: Jareth Almaguer  Authorized by: Mario Alberto Babin MD     Needle Size:  22 G  Guidance: landmark guided    Approach:  Lateral  Location:  Shoulder      Site:  R subacromial bursa  Medications:  80 mg methylPREDNISolone 80 MG/ML; 4 mL lidocaine (PF) 1 %  Outcome:  Tolerated well, no immediate complications  Procedure discussed: discussed risks, benefits, and alternatives    Consent Given by:  Patient  Timeout: timeout called immediately prior to procedure    Prep: patient was prepped and draped in usual sterile fashion

## 2021-05-08 ENCOUNTER — IMMUNIZATION (OUTPATIENT)
Dept: NURSING | Facility: CLINIC | Age: 56
End: 2021-05-08
Attending: INTERNAL MEDICINE
Payer: MEDICAID

## 2021-05-08 PROCEDURE — 91301 PR COVID VAC MODERNA 100 MCG/0.5 ML IM: CPT

## 2021-05-08 PROCEDURE — 0012A PR COVID VAC MODERNA 100 MCG/0.5 ML IM: CPT

## 2021-05-30 ENCOUNTER — HEALTH MAINTENANCE LETTER (OUTPATIENT)
Age: 56
End: 2021-05-30

## 2021-09-19 ENCOUNTER — HEALTH MAINTENANCE LETTER (OUTPATIENT)
Age: 56
End: 2021-09-19

## 2022-01-26 ENCOUNTER — IMMUNIZATION (OUTPATIENT)
Dept: NURSING | Facility: CLINIC | Age: 57
End: 2022-01-26
Payer: MEDICAID

## 2022-01-26 PROCEDURE — 0064A COVID-19,PF,MODERNA (18+ YRS BOOSTER .25ML): CPT

## 2022-01-26 PROCEDURE — 91306 COVID-19,PF,MODERNA (18+ YRS BOOSTER .25ML): CPT

## 2022-05-01 ENCOUNTER — HEALTH MAINTENANCE LETTER (OUTPATIENT)
Age: 57
End: 2022-05-01

## 2022-06-26 ENCOUNTER — HEALTH MAINTENANCE LETTER (OUTPATIENT)
Age: 57
End: 2022-06-26

## 2022-11-21 ENCOUNTER — HEALTH MAINTENANCE LETTER (OUTPATIENT)
Age: 57
End: 2022-11-21

## 2023-07-08 ENCOUNTER — HEALTH MAINTENANCE LETTER (OUTPATIENT)
Age: 58
End: 2023-07-08

## 2024-08-31 ENCOUNTER — HEALTH MAINTENANCE LETTER (OUTPATIENT)
Age: 59
End: 2024-08-31

## 2025-01-28 ENCOUNTER — TRANSFERRED RECORDS (OUTPATIENT)
Dept: HEALTH INFORMATION MANAGEMENT | Facility: CLINIC | Age: 60
End: 2025-01-28
Payer: MEDICAID

## 2025-01-29 ENCOUNTER — OFFICE VISIT (OUTPATIENT)
Dept: FAMILY MEDICINE | Facility: CLINIC | Age: 60
End: 2025-01-29
Payer: MEDICAID

## 2025-01-29 VITALS
TEMPERATURE: 98.4 F | RESPIRATION RATE: 21 BRPM | HEART RATE: 86 BPM | DIASTOLIC BLOOD PRESSURE: 89 MMHG | SYSTOLIC BLOOD PRESSURE: 159 MMHG | OXYGEN SATURATION: 99 % | BODY MASS INDEX: 28.38 KG/M2 | HEIGHT: 58 IN | WEIGHT: 135.2 LBS

## 2025-01-29 DIAGNOSIS — Z53.20 MAMMOGRAM DECLINED: ICD-10-CM

## 2025-01-29 DIAGNOSIS — H40.053 DISEASE OF BOTH EYES CHARACTERIZED BY INCREASED EYE PRESSURE: ICD-10-CM

## 2025-01-29 DIAGNOSIS — Z13.220 LIPID SCREENING: ICD-10-CM

## 2025-01-29 DIAGNOSIS — Z12.11 SCREEN FOR COLON CANCER: ICD-10-CM

## 2025-01-29 DIAGNOSIS — R20.0 NUMBNESS IN FEET: ICD-10-CM

## 2025-01-29 DIAGNOSIS — I10 ESSENTIAL HYPERTENSION WITH GOAL BLOOD PRESSURE LESS THAN 140/90: Primary | ICD-10-CM

## 2025-01-29 DIAGNOSIS — Z53.20 CERVICAL CANCER SCREENING DECLINED: ICD-10-CM

## 2025-01-29 DIAGNOSIS — Z28.20 VACCINE REFUSED BY PATIENT: ICD-10-CM

## 2025-01-29 LAB
ERYTHROCYTE [DISTWIDTH] IN BLOOD BY AUTOMATED COUNT: 11.6 % (ref 10–15)
EST. AVERAGE GLUCOSE BLD GHB EST-MCNC: 123 MG/DL
HBA1C MFR BLD: 5.9 % (ref 0–5.6)
HCT VFR BLD AUTO: 36.1 % (ref 35–47)
HGB BLD-MCNC: 13 G/DL (ref 11.7–15.7)
MCH RBC QN AUTO: 29.7 PG (ref 26.5–33)
MCHC RBC AUTO-ENTMCNC: 36 G/DL (ref 31.5–36.5)
MCV RBC AUTO: 82 FL (ref 78–100)
PLATELET # BLD AUTO: 238 10E3/UL (ref 150–450)
RBC # BLD AUTO: 4.38 10E6/UL (ref 3.8–5.2)
WBC # BLD AUTO: 7.1 10E3/UL (ref 4–11)

## 2025-01-29 PROCEDURE — 99203 OFFICE O/P NEW LOW 30 MIN: CPT | Performed by: PREVENTIVE MEDICINE

## 2025-01-29 PROCEDURE — 36415 COLL VENOUS BLD VENIPUNCTURE: CPT | Performed by: PREVENTIVE MEDICINE

## 2025-01-29 PROCEDURE — 82570 ASSAY OF URINE CREATININE: CPT | Performed by: PREVENTIVE MEDICINE

## 2025-01-29 PROCEDURE — 80053 COMPREHEN METABOLIC PANEL: CPT | Performed by: PREVENTIVE MEDICINE

## 2025-01-29 PROCEDURE — 83036 HEMOGLOBIN GLYCOSYLATED A1C: CPT | Performed by: PREVENTIVE MEDICINE

## 2025-01-29 PROCEDURE — 85027 COMPLETE CBC AUTOMATED: CPT | Performed by: PREVENTIVE MEDICINE

## 2025-01-29 PROCEDURE — 83721 ASSAY OF BLOOD LIPOPROTEIN: CPT | Mod: 59 | Performed by: PREVENTIVE MEDICINE

## 2025-01-29 PROCEDURE — 80061 LIPID PANEL: CPT | Performed by: PREVENTIVE MEDICINE

## 2025-01-29 PROCEDURE — 82607 VITAMIN B-12: CPT | Performed by: PREVENTIVE MEDICINE

## 2025-01-29 PROCEDURE — 82043 UR ALBUMIN QUANTITATIVE: CPT | Performed by: PREVENTIVE MEDICINE

## 2025-01-29 PROCEDURE — 84443 ASSAY THYROID STIM HORMONE: CPT | Performed by: PREVENTIVE MEDICINE

## 2025-01-29 ASSESSMENT — ENCOUNTER SYMPTOMS: EYE PAIN: 1

## 2025-01-29 ASSESSMENT — PAIN SCALES - GENERAL: PAINLEVEL_OUTOF10: NO PAIN (0)

## 2025-01-29 ASSESSMENT — PATIENT HEALTH QUESTIONNAIRE - PHQ9: SUM OF ALL RESPONSES TO PHQ QUESTIONS 1-9: 11

## 2025-01-29 NOTE — RESULT ENCOUNTER NOTE
Tong,     Complete blood count is not showing anemia or infection.  Three month glucose number Hemoglobin A1C is suggestive of pre diabetes.  Prediabetes means your blood sugar is high and you are at increased risk for developing overt diabetes in the future.   Be sure to monitor your intake of things like bread, pasta, rice, starchy foods (ie: potatoes), sugary beverages (ie: soda, juice-even the natural kind) and alcohol.      Please do not hesitate to call us at [phone] if you have any questions or concerns.    Thank you,    Margoth Sanchez MD MPH

## 2025-01-29 NOTE — PROGRESS NOTES
"  Assessment & Plan     Essential hypertension with goal blood pressure less than 140/90  -used to be on Hyzaar but not taken for several years  -significant BP fluctuation during visit today  -will monitor at home  -ancillary blood pressure check in 2 weeks, if home readings and ancillary check are elevated then plan for restarting medication. Would proceed with Calcium channel blocker Amlodipine.   - CBC with platelets  - Albumin Random Urine Quantitative with Creat Ratio    Screen for colon cancer  - Fecal colorectal cancer screen FIT - Future (S+30)    Mammogram declined  -declined for now     Cervical cancer screening declined  -declined for now     Disease of both eyes characterized by increased eye pressure  -Seeing EYE  -no records available at this time     Lipid screening  - Lipid panel reflex to direct LDL Non-fasting    Vaccine refused by patient  -declined all vaccines today     Numbness in feet  -await labs   - CBC with platelets  - Comprehensive metabolic panel  - TSH with free T4 reflex  - Hemoglobin A1c  - Vitamin B12        BMI  Estimated body mass index is 28.04 kg/m  as calculated from the following:    Height as of this encounter: 1.479 m (4' 10.23\").    Weight as of this encounter: 61.3 kg (135 lb 3.2 oz).   Weight management plan: Discussed healthy diet and exercise guidelines    Depression Screening Follow Up        1/29/2025     2:39 PM   PHQ   PHQ-9 Total Score 11   Q9: Thoughts of better off dead/self-harm past 2 weeks Not at all         Follow Up Actions Taken  Crisis resource information provided in After Visit Summary           Jesse Nieto is a 59 year old, presenting for the following health issues:  Eye Problem and Hypertension (Pt is seeing an eye specialist for the eye problem. Today's appointment is for blood pressure )        1/29/2025     2:34 PM   Additional Questions   Roomed by Trri   Accompanied by Friend- May         1/29/2025     2:34 PM   Patient Reported Additional " "Medications   Patient reports taking the following new medications no     Wt Readings from Last 2 Encounters:   01/29/25 61.3 kg (135 lb 3.2 oz)   03/17/21 61.2 kg (135 lb)      Family friend is interpreting    History of Present Illness       Reason for visit:  Blood pressure   She is taking medications regularly.  Overdue for labs    Last week went to regular eye doctor and was told to see a Specialist.  Has been seen by EYE.  Has high pressure in the eyes. Unclear if glaucoma. I do not have records.  Will follow up with EYE doctor and monitor pressure.  Blood pressure there was 195/99  Last visit with PCP was in 2021.    No BP medication for some time  No chest pain  No syncope  No hematuria  No exercise  No severe headaches  Sometime may have headaches  No snoring     Significant fluctuation in blood pressure within short time frames     Possible high BP problem  How many servings of fruits and vegetables do you eat daily?  2-3  On average, how many sweetened beverages do you drink each day (Examples: soda, juice, sweet tea, etc.  Do NOT count diet or artificially sweetened beverages)?   0  How many days per week do you exercise enough to make your heart beat faster? 3 or less  How many minutes a day do you exercise enough to make your heart beat faster? 9 or less  How many days per week do you miss taking your medication? 0    Hypertension Follow-up    Do you check your blood pressure regularly outside of the clinic? No   Are you following a low salt diet? No  Are your blood pressures ever more than 140 on the top number (systolic) OR more   than 90 on the bottom number (diastolic), for example 140/90? N/A        Objective    BP (!) 159/89 (BP Location: Left arm, Patient Position: Sitting, Cuff Size: Adult Regular)   Pulse 86   Temp 98.4  F (36.9  C) (Temporal)   Resp 21   Ht 1.479 m (4' 10.23\")   Wt 61.3 kg (135 lb 3.2 oz)   LMP  (LMP Unknown)   SpO2 99%   BMI 28.04 kg/m    Body mass index is 28.04 " kg/m .  Physical Exam   GENERAL APPEARANCE: healthy, alert and no distress  NECK: no adenopathy and trachea midline and normal to palpation  RESP: lungs clear to auscultation - no rales, rhonchi or wheezes  CV: regular rates and rhythm, normal S1 S2  ABDOMEN: soft, non-tender and no rebound or guarding   MS: extremities normal- no gross deformities noted   SKIN: no suspicious lesions or rashes  NEURO: Normal strength and tone, mentation intact and speech normal  PSYCH: mentation appears normal      No results found for this or any previous visit (from the past 24 hours).        Signed Electronically by: Margoth Sanchez MD

## 2025-01-30 LAB
ALBUMIN SERPL BCG-MCNC: 4.1 G/DL (ref 3.5–5.2)
ALP SERPL-CCNC: 77 U/L (ref 40–150)
ALT SERPL W P-5'-P-CCNC: 38 U/L (ref 0–50)
ANION GAP SERPL CALCULATED.3IONS-SCNC: 8 MMOL/L (ref 7–15)
AST SERPL W P-5'-P-CCNC: 33 U/L (ref 0–45)
BILIRUB SERPL-MCNC: 0.3 MG/DL
BUN SERPL-MCNC: 19.2 MG/DL (ref 8–23)
CALCIUM SERPL-MCNC: 8.9 MG/DL (ref 8.8–10.4)
CHLORIDE SERPL-SCNC: 104 MMOL/L (ref 98–107)
CHOLEST SERPL-MCNC: 186 MG/DL
CREAT SERPL-MCNC: 0.72 MG/DL (ref 0.51–0.95)
CREAT UR-MCNC: 71.9 MG/DL
EGFRCR SERPLBLD CKD-EPI 2021: >90 ML/MIN/1.73M2
FASTING STATUS PATIENT QL REPORTED: NO
FASTING STATUS PATIENT QL REPORTED: NO
GLUCOSE SERPL-MCNC: 97 MG/DL (ref 70–99)
HCO3 SERPL-SCNC: 27 MMOL/L (ref 22–29)
HDLC SERPL-MCNC: 38 MG/DL
LDLC SERPL CALC-MCNC: ABNORMAL MG/DL
LDLC SERPL DIRECT ASSAY-MCNC: 92 MG/DL
MICROALBUMIN UR-MCNC: <12 MG/L
MICROALBUMIN/CREAT UR: NORMAL MG/G{CREAT}
NONHDLC SERPL-MCNC: 148 MG/DL
POTASSIUM SERPL-SCNC: 4.3 MMOL/L (ref 3.4–5.3)
PROT SERPL-MCNC: 7.9 G/DL (ref 6.4–8.3)
SODIUM SERPL-SCNC: 139 MMOL/L (ref 135–145)
TRIGL SERPL-MCNC: 403 MG/DL
TSH SERPL DL<=0.005 MIU/L-ACNC: 2.39 UIU/ML (ref 0.3–4.2)
VIT B12 SERPL-MCNC: 442 PG/ML (ref 232–1245)

## 2025-01-30 NOTE — RESULT ENCOUNTER NOTE
Dear Gerson Mcgrath    Here are your cholesterol results:    Your LDL is: Lab Results       Component                Value               Date                       LDL                      92                  01/29/2025          Your LDL goal is to be less than 130  Your HDL is: Lab Results       Component                Value               Date                       HDL                      38                  01/29/2025           Goal HDL is Greater than 40 (for men) or 50 (for women).  Your Triglycerides are: Lab Results       Component                Value               Date                       TRIG                     403                 01/29/2025          Goal TRIGLYCERIDES are less than 150.       Here are some ways to improve your cholesterol:    Try to get at least 45 minutes of aerobic exercise 5-6 days a week  Maintain a healthy body weight  Eat less saturated fats  Buy lean cuts of meat, reduce your portions of red meat or substitute poultry or fish  Avoid fried or fast foods that are high in fat  Eat more fruits and vegetables      Urine sample is not showing any abnormal protein.  Vitamin B12 levels are normal.  Electrolytes, glucose, kidney function, liver function, and thyroid function labs are normal.    Please do not hesitate to call us at [phone] if you have any questions or concerns.    Thank you,    Margoth Sanchez MD MPH

## 2025-02-12 ENCOUNTER — ALLIED HEALTH/NURSE VISIT (OUTPATIENT)
Dept: FAMILY MEDICINE | Facility: CLINIC | Age: 60
End: 2025-02-12
Payer: MEDICAID

## 2025-02-12 VITALS — SYSTOLIC BLOOD PRESSURE: 174 MMHG | DIASTOLIC BLOOD PRESSURE: 90 MMHG

## 2025-02-12 DIAGNOSIS — Z01.30 BLOOD PRESSURE CHECK: Primary | ICD-10-CM

## 2025-02-12 DIAGNOSIS — I10 ESSENTIAL HYPERTENSION WITH GOAL BLOOD PRESSURE LESS THAN 140/90: ICD-10-CM

## 2025-02-12 PROCEDURE — 99207 PR NO CHARGE NURSE ONLY: CPT

## 2025-02-12 RX ORDER — AMLODIPINE BESYLATE 5 MG/1
5 TABLET ORAL DAILY
Qty: 90 TABLET | Refills: 0 | Status: SHIPPED | OUTPATIENT
Start: 2025-02-12

## 2025-02-12 NOTE — PROGRESS NOTES
Based on last clinic discussion since blood pressure is elevated, will start calcium channel blocker Amlodipine 5 mg daily.  Scheduled for a follow up.    Margoth Sanchez MD MPH

## 2025-02-12 NOTE — PROGRESS NOTES
I met with Gerson Mcgrath at the request of Daniel to recheck her blood pressure.  Blood pressure medications on the med list were reviewed with patient.    Patient has taken all medications as per usual regimen: NA  Patient reports tolerating them without any issues or concerns: NA    Vitals:    02/12/25 1031 02/12/25 1040   BP: (!) 178/81 (!) 174/90   BP Location: Left arm Left arm   Patient Position: Sitting Sitting   Cuff Size: Adult Regular Adult Regular       After 5 minutes, the patient's blood pressure remained greater than or equal to 140/90.    Is the patient currently having any chest pain? No  Does the patient currently have a headache? No  Does the patient currently have any vision changes? No  Does the patient currently have any nausea? No  Does the patient currently have any abdominal pain? No    The previous encounter was reviewed.  The patient was discharged and the note will be sent to the provider for final review.  Patient was instructed to scheduled with follow up with .

## 2025-02-12 NOTE — PROGRESS NOTES
This RN attempted to reach patient on 2/12/25 with Dora jackson.  Left message to return call.      If they call back:    Please give provider message as written below.      Kristina Kjellberg, MSN, RN

## 2025-02-13 NOTE — PROGRESS NOTES
Called and spoke with patient via [employer]  ID # [id number]. Relayed below provider message as written. Pt verbalized understanding and she will  medication later today at pharmacy.   She has a follow-up already scheduled for 3/5/25.     Pt asked regarding her 1/29/25 lab results. RN relayed the result message placed by Dr. Sanchez on 1/30/2025. Pt verbalized understanding and agrees with plan.       No further questions/concerns.       Rosa Caceres RN    [facility]          Dear Gerson Mcgrath     Here are your cholesterol results:     Your LDL is: Lab Results       Component                Value               Date                       LDL                      92                  01/29/2025          Your LDL goal is to be less than 130  Your HDL is: Lab Results       Component                Value               Date                       HDL                      38                  01/29/2025           Goal HDL is Greater than 40 (for men) or 50 (for women).  Your Triglycerides are: Lab Results       Component                Value               Date                       TRIG                     403                 01/29/2025          Goal TRIGLYCERIDES are less than 150.        Here are some ways to improve your cholesterol:     Try to get at least 45 minutes of aerobic exercise 5-6 days a week  Maintain a healthy body weight  Eat less saturated fats  Buy lean cuts of meat, reduce your portions of red meat or substitute poultry or fish  Avoid fried or fast foods that are high in fat  Eat more fruits and vegetables        Urine sample is not showing any abnormal protein.  Vitamin B12 levels are normal.  Electrolytes, glucose, kidney function, liver function, and thyroid function labs are normal.     Please do not hesitate to call us at [phone] if you have any questions or concerns.     Thank you,     Margoth Sanchez MD MPH

## 2025-03-04 ENCOUNTER — TRANSFERRED RECORDS (OUTPATIENT)
Dept: HEALTH INFORMATION MANAGEMENT | Facility: CLINIC | Age: 60
End: 2025-03-04
Payer: MEDICAID

## 2025-03-20 ENCOUNTER — OFFICE VISIT (OUTPATIENT)
Dept: FAMILY MEDICINE | Facility: CLINIC | Age: 60
End: 2025-03-20
Payer: MEDICAID

## 2025-03-20 VITALS
RESPIRATION RATE: 16 BRPM | BODY MASS INDEX: 28.38 KG/M2 | OXYGEN SATURATION: 100 % | WEIGHT: 135.2 LBS | DIASTOLIC BLOOD PRESSURE: 84 MMHG | TEMPERATURE: 98 F | SYSTOLIC BLOOD PRESSURE: 163 MMHG | HEART RATE: 89 BPM | HEIGHT: 58 IN

## 2025-03-20 DIAGNOSIS — I10 ESSENTIAL HYPERTENSION WITH GOAL BLOOD PRESSURE LESS THAN 140/90: Primary | ICD-10-CM

## 2025-03-20 DIAGNOSIS — J30.2 SEASONAL ALLERGIC RHINITIS, UNSPECIFIED TRIGGER: ICD-10-CM

## 2025-03-20 DIAGNOSIS — Z53.20 CERVICAL CANCER SCREENING DECLINED: ICD-10-CM

## 2025-03-20 DIAGNOSIS — Z28.20 VACCINE REFUSED BY PATIENT: ICD-10-CM

## 2025-03-20 DIAGNOSIS — M25.561 ACUTE PAIN OF RIGHT KNEE: ICD-10-CM

## 2025-03-20 DIAGNOSIS — L30.9 DERMATITIS: ICD-10-CM

## 2025-03-20 DIAGNOSIS — Z53.20 MAMMOGRAM DECLINED: ICD-10-CM

## 2025-03-20 RX ORDER — TRIAMCINOLONE ACETONIDE 1 MG/G
OINTMENT TOPICAL 2 TIMES DAILY
Qty: 45 G | Refills: 0 | Status: SHIPPED | OUTPATIENT
Start: 2025-03-20

## 2025-03-20 RX ORDER — CETIRIZINE HYDROCHLORIDE 10 MG/1
10 TABLET ORAL DAILY
Qty: 90 TABLET | Refills: 3 | Status: SHIPPED | OUTPATIENT
Start: 2025-03-20

## 2025-03-20 RX ORDER — AMLODIPINE BESYLATE 10 MG/1
10 TABLET ORAL DAILY
Qty: 90 TABLET | Refills: 3 | Status: SHIPPED | OUTPATIENT
Start: 2025-03-20

## 2025-03-20 ASSESSMENT — PAIN SCALES - GENERAL: PAINLEVEL_OUTOF10: MODERATE PAIN (6)

## 2025-03-20 NOTE — PROGRESS NOTES
Assessment & Plan     Essential hypertension with goal blood pressure less than 140/90  Blood pressure readings are improving but still elevated  No side effects to amlodipine 5 mg daily, increase dosage to 10 mg daily  -Prescription for home blood pressure monitor provided so patient can check blood pressure at home instead of having to come into the clinic which is difficult from a transportation standpoint   - amLODIPine (NORVASC) 10 MG tablet  Dispense: 90 tablet; Refill: 3  - Home Blood Pressure Monitor Order    Mammogram declined  -declined mammogram     Cervical cancer screening declined  declined Pap smear    Dermatitis  Refill on medication provided. patient does not use for over 2 weeks at a time   - triamcinolone (KENALOG) 0.1 % external ointment  Dispense: 45 g; Refill: 0    Acute pain of right knee  -has had pain in her right knee for about a week   -rest, ice, Tylenol as needed   -no concerning changes on exam hence defer x-rays at this time    Seasonal allergic rhinitis, unspecified trigger  - cetirizine (ZYRTEC) 10 MG tablet  Dispense: 90 tablet; Refill: 3    Vaccine refused by patient  -Declined all vaccines today          I ended our visit today by discussing the patient's diagnoses and recommended treatment. Please refer to today's diagnoses and orders for further details. I briefly discussed the pathophysiology of these conditions and outlined their expected course. I discussed the warning symptoms and signs that indicate an atypical course that would need urgent or emergent care. I also discussed self care strategies for symptom relief.  Common side effects of medications prescribed at this visit were discussed with the patient. Severe side effects, including current applicable black box warnings, were discussed.         Jesse Nieto is a 59 year old, presenting for the following health issues:  Hypertension        3/20/2025     1:13 PM   Additional Questions   Roomed by Laura VELEZ  "  Accompanied by friend  May     History of Present Illness       Hypertension: She presents for follow up of hypertension.  She does check blood pressure  regularly outside of the clinic. Outside blood pressures have been over 140/90. She does not follow a low salt diet.     She eats 2-3 servings of fruits and vegetables daily.She consumes 0 sweetened beverage(s) daily.She exercises with enough effort to increase her heart rate 9 or less minutes per day.  She exercises with enough effort to increase her heart rate 3 or less days per week.   She is taking medications regularly.        The 10-year ASCVD risk score (Trever TERRY, et al., 2019) is: 8.6%    Values used to calculate the score:      Age: 59 years      Sex: Female      Is Non- : No      Diabetic: No      Tobacco smoker: No      Systolic Blood Pressure: 163 mmHg      Is BP treated: Yes      HDL Cholesterol: 38 mg/dL      Total Cholesterol: 186 mg/dL    Seasonal allergies and cough:  -all the time  -itchy all the time  -no eye itching  -seeing eye specialist and gets a shot every month in the right eye    One week of pain in the back of the right knee  No trauma  No edema  No injury   No redness  No past history  No feeling of giving out  Able to sit cross legged   No falls    Dermatitis:  -uses kenalog and ketoconazole topical for itching    Lab work completed previously was discussed in detail with the patient.        Objective    BP (!) 163/84 (BP Location: Left arm, Patient Position: Sitting, Cuff Size: Adult Regular)   Pulse 89   Temp 98  F (36.7  C) (Oral)   Resp 16   Ht 1.48 m (4' 10.25\")   Wt 61.3 kg (135 lb 3.2 oz)   LMP  (LMP Unknown)   SpO2 100%   BMI 28.01 kg/m    Body mass index is 28.01 kg/m .  Physical Exam   GENERAL APPEARANCE: healthy, alert and no distress  EYES: Eyes grossly normal to inspection and conjunctivae and sclerae normal  RESP: lungs clear to auscultation - no rales, rhonchi or wheezes  CV: regular " rates and rhythm, normal S1 S2  ABDOMEN: soft, non-tender and no rebound or guarding   MS: extremities normal- no gross deformities noted  SKIN: no suspicious lesions or rashes  NEURO: Normal strength and tone, mentation intact and speech normal  PSYCH: mentation appears normal  Right knee: Inspection does not show any gross deformities.  No edema, no erythema, no skin changes, no rash.  No tibial tuberosity tenderness, no tenderness along medial, and lateral joint lines.  No pes anserine bursitis.  No popliteal tenderness.  Full range of motion.  No quadriceps atrophy.  Strength is 5/5.  Neurovascular intact.  Anterior and posterior drawer signs are negative.  Varus and valgus stress negative.  Inge test negative.        Office Visit on 01/29/2025   Component Date Value Ref Range Status    WBC Count 01/29/2025 7.1  4.0 - 11.0 10e3/uL Final    RBC Count 01/29/2025 4.38  3.80 - 5.20 10e6/uL Final    Hemoglobin 01/29/2025 13.0  11.7 - 15.7 g/dL Final    Hematocrit 01/29/2025 36.1  35.0 - 47.0 % Final    MCV 01/29/2025 82  78 - 100 fL Final    MCH 01/29/2025 29.7  26.5 - 33.0 pg Final    MCHC 01/29/2025 36.0  31.5 - 36.5 g/dL Final    RDW 01/29/2025 11.6  10.0 - 15.0 % Final    Platelet Count 01/29/2025 238  150 - 450 10e3/uL Final    Sodium 01/29/2025 139  135 - 145 mmol/L Final    Potassium 01/29/2025 4.3  3.4 - 5.3 mmol/L Final    Carbon Dioxide (CO2) 01/29/2025 27  22 - 29 mmol/L Final    Anion Gap 01/29/2025 8  7 - 15 mmol/L Final    Urea Nitrogen 01/29/2025 19.2  8.0 - 23.0 mg/dL Final    Creatinine 01/29/2025 0.72  0.51 - 0.95 mg/dL Final    GFR Estimate 01/29/2025 >90  >60 mL/min/1.73m2 Final    eGFR calculated using 2021 CKD-EPI equation.    Calcium 01/29/2025 8.9  8.8 - 10.4 mg/dL Final    Chloride 01/29/2025 104  98 - 107 mmol/L Final    Glucose 01/29/2025 97  70 - 99 mg/dL Final    Alkaline Phosphatase 01/29/2025 77  40 - 150 U/L Final    AST 01/29/2025 33  0 - 45 U/L Final    ALT 01/29/2025 38  0 - 50  U/L Final    Protein Total 01/29/2025 7.9  6.4 - 8.3 g/dL Final    Albumin 01/29/2025 4.1  3.5 - 5.2 g/dL Final    Bilirubin Total 01/29/2025 0.3  <=1.2 mg/dL Final    Patient Fasting > 8hrs? 01/29/2025 No   Final    TSH 01/29/2025 2.39  0.30 - 4.20 uIU/mL Final    Creatinine Urine mg/dL 01/29/2025 71.9  mg/dL Final    The reference ranges have not been established in urine creatinine. The results should be integrated into the clinical context for interpretation.    Albumin Urine mg/L 01/29/2025 <12.0  mg/L Final    The reference ranges have not been established in urine albumin. The results should be integrated into the clinical context for interpretation.    Albumin Urine mg/g Cr 01/29/2025    Final    Unable to calculate, urine albumin and/or urine creatinine is outside detectable limits.  Microalbuminuria is defined as an albumin:creatinine ratio of 17 to 299 for males and 25 to 299 for females. A ratio of albumin:creatinine of 300 or higher is indicative of overt proteinuria.  Due to biologic variability, positive results should be confirmed by a second, first-morning random or 24-hour timed urine specimen. If there is discrepancy, a third specimen is recommended. When 2 out of 3 results are in the microalbuminuria range, this is evidence for incipient nephropathy and warrants increased efforts at glucose control, blood pressure control, and institution of therapy with an angiotensin-converting-enzyme (ACE) inhibitor (if the patient can tolerate it).      Cholesterol 01/29/2025 186  <200 mg/dL Final    Triglycerides 01/29/2025 403 (H)  <150 mg/dL Final    Direct Measure HDL 01/29/2025 38 (L)  >=50 mg/dL Final    LDL Cholesterol Calculated 01/29/2025    Final    Cannot estimate LDL when triglyceride exceeds 400 mg/dL    Non HDL Cholesterol 01/29/2025 148 (H)  <130 mg/dL Final    Patient Fasting > 8hrs? 01/29/2025 No   Final    Estimated Average Glucose 01/29/2025 123 (H)  <117 mg/dL Final    Hemoglobin A1C  01/29/2025 5.9 (H)  0.0 - 5.6 % Final    Normal <5.7%   Prediabetes 5.7-6.4%    Diabetes 6.5% or higher     Note: Adopted from ADA consensus guidelines.    Vitamin B12 01/29/2025 442  232 - 1,245 pg/mL Final    LDL Cholesterol Direct 01/29/2025 92  <100 mg/dL Final    Age 2-19 years:  Desirable: < 110 mg/dL   Borderline High: 110-129 mg/dL   High: >= 130 mg/dL    Age 20 years and older:  Desirable: < 100 mg/dL  Above Desirable: 100-129 mg/dL   Borderline High: 130-159 mg/dL   High: 160-189 mg/dL   Very High: >= 190 mg/dL           No results found for this or any previous visit (from the past 24 hours).        Signed Electronically by: Margoth Sanchez MD    DME (Durable Medical Equipment) Orders and Documentation  Orders Placed This Encounter   Procedures    Home Blood Pressure Monitor Order        The patient was assessed and it was determined the patient is in need of the following listed DME Supplies/Equipment. Please complete supporting documentation below to demonstrate medical necessity.

## 2025-08-21 ENCOUNTER — PATIENT OUTREACH (OUTPATIENT)
Dept: CARE COORDINATION | Facility: CLINIC | Age: 60
End: 2025-08-21
Payer: MEDICAID